# Patient Record
Sex: MALE | Race: WHITE | Employment: OTHER | URBAN - METROPOLITAN AREA
[De-identification: names, ages, dates, MRNs, and addresses within clinical notes are randomized per-mention and may not be internally consistent; named-entity substitution may affect disease eponyms.]

---

## 2017-12-22 ENCOUNTER — LAB REQUISITION (OUTPATIENT)
Dept: LAB | Facility: HOSPITAL | Age: 67
End: 2017-12-22
Payer: MEDICARE

## 2017-12-22 DIAGNOSIS — M71.121 OTHER INFECTIVE BURSITIS, RIGHT ELBOW: ICD-10-CM

## 2017-12-22 PROCEDURE — 89051 BODY FLUID CELL COUNT: CPT | Performed by: ORTHOPAEDIC SURGERY

## 2017-12-22 PROCEDURE — 89060 EXAM SYNOVIAL FLUID CRYSTALS: CPT | Performed by: ORTHOPAEDIC SURGERY

## 2017-12-22 PROCEDURE — 87205 SMEAR GRAM STAIN: CPT | Performed by: ORTHOPAEDIC SURGERY

## 2017-12-22 PROCEDURE — 87070 CULTURE OTHR SPECIMN AEROBIC: CPT | Performed by: ORTHOPAEDIC SURGERY

## 2017-12-22 PROCEDURE — 89050 BODY FLUID CELL COUNT: CPT | Performed by: ORTHOPAEDIC SURGERY

## 2018-01-05 ENCOUNTER — APPOINTMENT (OUTPATIENT)
Dept: LAB | Facility: HOSPITAL | Age: 68
End: 2018-01-05
Attending: ORTHOPAEDIC SURGERY
Payer: MEDICARE

## 2018-01-05 DIAGNOSIS — M70.21 OLECRANON BURSITIS OF RIGHT ELBOW: ICD-10-CM

## 2018-01-05 DIAGNOSIS — Z01.818 PREOP TESTING: Primary | ICD-10-CM

## 2018-01-05 LAB
ANION GAP SERPL CALC-SCNC: 10 MMOL/L (ref 0–18)
BUN SERPL-MCNC: 16 MG/DL (ref 8–20)
BUN/CREAT SERPL: 13.8 (ref 10–20)
CALCIUM SERPL-MCNC: 9 MG/DL (ref 8.5–10.5)
CHLORIDE SERPL-SCNC: 102 MMOL/L (ref 95–110)
CO2 SERPL-SCNC: 28 MMOL/L (ref 22–32)
CREAT SERPL-MCNC: 1.16 MG/DL (ref 0.5–1.5)
GLUCOSE SERPL-MCNC: 96 MG/DL (ref 70–99)
OSMOLALITY UR CALC.SUM OF ELEC: 291 MOSM/KG (ref 275–295)
POTASSIUM SERPL-SCNC: 3 MMOL/L (ref 3.3–5.1)
SODIUM SERPL-SCNC: 140 MMOL/L (ref 136–144)

## 2018-01-05 PROCEDURE — 80048 BASIC METABOLIC PNL TOTAL CA: CPT

## 2018-01-05 PROCEDURE — 93010 ELECTROCARDIOGRAM REPORT: CPT | Performed by: ORTHOPAEDIC SURGERY

## 2018-01-05 PROCEDURE — 36415 COLL VENOUS BLD VENIPUNCTURE: CPT

## 2018-01-05 PROCEDURE — 93005 ELECTROCARDIOGRAM TRACING: CPT

## 2018-01-05 RX ORDER — LISINOPRIL 30 MG/1
30 TABLET ORAL 2 TIMES DAILY
COMMUNITY

## 2018-01-05 RX ORDER — HYDROCHLOROTHIAZIDE 25 MG/1
25 TABLET ORAL DAILY
COMMUNITY

## 2018-01-05 RX ORDER — AMLODIPINE BESYLATE 10 MG/1
10 TABLET ORAL DAILY
COMMUNITY

## 2018-01-05 RX ORDER — METOPROLOL SUCCINATE 100 MG/1
100 TABLET, EXTENDED RELEASE ORAL DAILY
COMMUNITY

## 2018-01-05 RX ORDER — RANITIDINE 150 MG/1
150 TABLET ORAL 2 TIMES DAILY
COMMUNITY

## 2018-01-05 RX ORDER — POTASSIUM CHLORIDE 1.5 G/1.77G
20 POWDER, FOR SOLUTION ORAL 2 TIMES DAILY
COMMUNITY

## 2018-01-08 ENCOUNTER — ANESTHESIA (OUTPATIENT)
Dept: SURGERY | Facility: HOSPITAL | Age: 68
End: 2018-01-08
Payer: MEDICARE

## 2018-01-08 ENCOUNTER — SURGERY (OUTPATIENT)
Age: 68
End: 2018-01-08

## 2018-01-08 ENCOUNTER — ANESTHESIA EVENT (OUTPATIENT)
Dept: SURGERY | Facility: HOSPITAL | Age: 68
End: 2018-01-08
Payer: MEDICARE

## 2018-01-08 ENCOUNTER — HOSPITAL ENCOUNTER (OUTPATIENT)
Facility: HOSPITAL | Age: 68
Setting detail: HOSPITAL OUTPATIENT SURGERY
Discharge: HOME | End: 2018-01-08
Attending: ORTHOPAEDIC SURGERY | Admitting: ORTHOPAEDIC SURGERY
Payer: MEDICARE

## 2018-01-08 VITALS
SYSTOLIC BLOOD PRESSURE: 122 MMHG | HEART RATE: 59 BPM | BODY MASS INDEX: 29.1 KG/M2 | DIASTOLIC BLOOD PRESSURE: 62 MMHG | HEIGHT: 68 IN | OXYGEN SATURATION: 94 % | WEIGHT: 192 LBS | RESPIRATION RATE: 18 BRPM | TEMPERATURE: 98 F

## 2018-01-08 DIAGNOSIS — M71.121 SEPTIC OLECRANON BURSITIS OF RIGHT ELBOW: Primary | ICD-10-CM

## 2018-01-08 PROCEDURE — 88304 TISSUE EXAM BY PATHOLOGIST: CPT | Performed by: ORTHOPAEDIC SURGERY

## 2018-01-08 PROCEDURE — 87205 SMEAR GRAM STAIN: CPT | Performed by: ORTHOPAEDIC SURGERY

## 2018-01-08 PROCEDURE — 0MB30ZZ EXCISION OF RIGHT ELBOW BURSA AND LIGAMENT, OPEN APPROACH: ICD-10-PCS | Performed by: ORTHOPAEDIC SURGERY

## 2018-01-08 PROCEDURE — 87176 TISSUE HOMOGENIZATION CULTR: CPT | Performed by: ORTHOPAEDIC SURGERY

## 2018-01-08 PROCEDURE — 87070 CULTURE OTHR SPECIMN AEROBIC: CPT | Performed by: ORTHOPAEDIC SURGERY

## 2018-01-08 PROCEDURE — 88305 TISSUE EXAM BY PATHOLOGIST: CPT | Performed by: ORTHOPAEDIC SURGERY

## 2018-01-08 PROCEDURE — 87075 CULTR BACTERIA EXCEPT BLOOD: CPT | Performed by: ORTHOPAEDIC SURGERY

## 2018-01-08 RX ORDER — LIDOCAINE HYDROCHLORIDE 20 MG/ML
INJECTION, SOLUTION EPIDURAL; INFILTRATION; INTRACAUDAL; PERINEURAL AS NEEDED
Status: DISCONTINUED | OUTPATIENT
Start: 2018-01-08 | End: 2018-01-08

## 2018-01-08 RX ORDER — METOPROLOL TARTRATE 5 MG/5ML
2.5 INJECTION INTRAVENOUS ONCE
Status: DISCONTINUED | OUTPATIENT
Start: 2018-01-08 | End: 2018-01-08

## 2018-01-08 RX ORDER — MORPHINE SULFATE 10 MG/ML
6 INJECTION, SOLUTION INTRAMUSCULAR; INTRAVENOUS EVERY 10 MIN PRN
Status: DISCONTINUED | OUTPATIENT
Start: 2018-01-08 | End: 2018-01-08

## 2018-01-08 RX ORDER — SODIUM CHLORIDE, SODIUM LACTATE, POTASSIUM CHLORIDE, CALCIUM CHLORIDE 600; 310; 30; 20 MG/100ML; MG/100ML; MG/100ML; MG/100ML
INJECTION, SOLUTION INTRAVENOUS CONTINUOUS
Status: DISCONTINUED | OUTPATIENT
Start: 2018-01-08 | End: 2018-01-08

## 2018-01-08 RX ORDER — LIDOCAINE HYDROCHLORIDE 10 MG/ML
INJECTION, SOLUTION EPIDURAL; INFILTRATION; INTRACAUDAL; PERINEURAL AS NEEDED
Status: DISCONTINUED | OUTPATIENT
Start: 2018-01-08 | End: 2018-01-08 | Stop reason: SURG

## 2018-01-08 RX ORDER — NALOXONE HYDROCHLORIDE 0.4 MG/ML
80 INJECTION, SOLUTION INTRAMUSCULAR; INTRAVENOUS; SUBCUTANEOUS AS NEEDED
Status: DISCONTINUED | OUTPATIENT
Start: 2018-01-08 | End: 2018-01-08

## 2018-01-08 RX ORDER — METOCLOPRAMIDE 10 MG/1
10 TABLET ORAL ONCE
Status: DISCONTINUED | OUTPATIENT
Start: 2018-01-08 | End: 2018-01-08 | Stop reason: HOSPADM

## 2018-01-08 RX ORDER — CEPHALEXIN 500 MG/1
500 CAPSULE ORAL 4 TIMES DAILY
Qty: 28 CAPSULE | Refills: 0 | Status: SHIPPED | OUTPATIENT
Start: 2018-01-08

## 2018-01-08 RX ORDER — ACETAMINOPHEN 500 MG
1000 TABLET ORAL ONCE
Status: COMPLETED | OUTPATIENT
Start: 2018-01-08 | End: 2018-01-08

## 2018-01-08 RX ORDER — HYDROCODONE BITARTRATE AND ACETAMINOPHEN 5; 325 MG/1; MG/1
1 TABLET ORAL AS NEEDED
Status: DISCONTINUED | OUTPATIENT
Start: 2018-01-08 | End: 2018-01-08

## 2018-01-08 RX ORDER — HYDROCODONE BITARTRATE AND ACETAMINOPHEN 5; 325 MG/1; MG/1
1 TABLET ORAL EVERY 4 HOURS PRN
Qty: 30 TABLET | Refills: 0 | Status: SHIPPED | OUTPATIENT
Start: 2018-01-08

## 2018-01-08 RX ORDER — MIDAZOLAM HYDROCHLORIDE 1 MG/ML
INJECTION INTRAMUSCULAR; INTRAVENOUS AS NEEDED
Status: DISCONTINUED | OUTPATIENT
Start: 2018-01-08 | End: 2018-01-08 | Stop reason: SURG

## 2018-01-08 RX ORDER — HYDROCODONE BITARTRATE AND ACETAMINOPHEN 5; 325 MG/1; MG/1
2 TABLET ORAL AS NEEDED
Status: DISCONTINUED | OUTPATIENT
Start: 2018-01-08 | End: 2018-01-08

## 2018-01-08 RX ORDER — HALOPERIDOL 5 MG/ML
0.25 INJECTION INTRAMUSCULAR ONCE AS NEEDED
Status: DISCONTINUED | OUTPATIENT
Start: 2018-01-08 | End: 2018-01-08

## 2018-01-08 RX ORDER — FAMOTIDINE 20 MG/1
20 TABLET ORAL ONCE
Status: DISCONTINUED | OUTPATIENT
Start: 2018-01-08 | End: 2018-01-08 | Stop reason: HOSPADM

## 2018-01-08 RX ORDER — HYDROMORPHONE HYDROCHLORIDE 1 MG/ML
0.2 INJECTION, SOLUTION INTRAMUSCULAR; INTRAVENOUS; SUBCUTANEOUS EVERY 5 MIN PRN
Status: DISCONTINUED | OUTPATIENT
Start: 2018-01-08 | End: 2018-01-08

## 2018-01-08 RX ORDER — MORPHINE SULFATE 4 MG/ML
4 INJECTION, SOLUTION INTRAMUSCULAR; INTRAVENOUS EVERY 10 MIN PRN
Status: DISCONTINUED | OUTPATIENT
Start: 2018-01-08 | End: 2018-01-08

## 2018-01-08 RX ORDER — ONDANSETRON 2 MG/ML
4 INJECTION INTRAMUSCULAR; INTRAVENOUS ONCE AS NEEDED
Status: DISCONTINUED | OUTPATIENT
Start: 2018-01-08 | End: 2018-01-08

## 2018-01-08 RX ORDER — CEFAZOLIN SODIUM/WATER 2 G/20 ML
2 SYRINGE (ML) INTRAVENOUS ONCE
Status: COMPLETED | OUTPATIENT
Start: 2018-01-08 | End: 2018-01-08

## 2018-01-08 RX ORDER — MORPHINE SULFATE 2 MG/ML
2 INJECTION, SOLUTION INTRAMUSCULAR; INTRAVENOUS EVERY 10 MIN PRN
Status: DISCONTINUED | OUTPATIENT
Start: 2018-01-08 | End: 2018-01-08

## 2018-01-08 RX ORDER — HYDROMORPHONE HYDROCHLORIDE 1 MG/ML
0.6 INJECTION, SOLUTION INTRAMUSCULAR; INTRAVENOUS; SUBCUTANEOUS EVERY 5 MIN PRN
Status: DISCONTINUED | OUTPATIENT
Start: 2018-01-08 | End: 2018-01-08

## 2018-01-08 RX ORDER — HYDROMORPHONE HYDROCHLORIDE 1 MG/ML
0.4 INJECTION, SOLUTION INTRAMUSCULAR; INTRAVENOUS; SUBCUTANEOUS EVERY 5 MIN PRN
Status: DISCONTINUED | OUTPATIENT
Start: 2018-01-08 | End: 2018-01-08

## 2018-01-08 RX ADMIN — MIDAZOLAM HYDROCHLORIDE 2 MG: 1 INJECTION INTRAMUSCULAR; INTRAVENOUS at 15:31:00

## 2018-01-08 RX ADMIN — LIDOCAINE HYDROCHLORIDE 50 MG: 10 INJECTION, SOLUTION EPIDURAL; INFILTRATION; INTRACAUDAL; PERINEURAL at 15:35:00

## 2018-01-08 RX ADMIN — SODIUM CHLORIDE, SODIUM LACTATE, POTASSIUM CHLORIDE, CALCIUM CHLORIDE: 600; 310; 30; 20 INJECTION, SOLUTION INTRAVENOUS at 15:31:00

## 2018-01-08 RX ADMIN — CEFAZOLIN SODIUM/WATER 2 G: 2 G/20 ML SYRINGE (ML) INTRAVENOUS at 15:51:00

## 2018-01-08 NOTE — OPERATIVE REPORT
Operative Note    Patient Name: Rito Baumgarten    Preoperative Diagnosis: right elbow septic olecranon bursitis    Postoperative Diagnosis: right elbow septic olecranon bursitis    Primary Surgeon: Murray Jackson MD     Assistant: Vilma Ballard, [facility]    Pro

## 2018-01-08 NOTE — ANESTHESIA POSTPROCEDURE EVALUATION
Patient: Neva Will    Procedure Summary     Date:  01/08/18 Room / Location:  [address] [place] [address] / [address] [place] [address] MAIN OR    Anesthesia Start:  7407 Anesthesia Stop:  0624    Procedure:  EXTREMITY UPPER IRRIGATION & DEBRIDEMENT (Right Elbow) Diagnosis:  (right elbow

## 2018-01-08 NOTE — H&P
ORTHO SURGERY H&P  Vidal Jacobs is a 75year old male. MRN is [id number]. Admitted: (Not on file)    CC: Right elbow pain and redness    HPI: Mr. Tess Washington is a 75year old male who presents to the office complaining of right elbow pain and redness.  He in smoke two packs of cigarettes a day for 26 years. He quit smoking 30 years ago. He reports occasional alcohol use. He consumes alcohol socially. He denies any intravenous, illicit, and recreational drug use.      Family History: CVA (father), HTN, and cance complete resolution of his symptoms. Dr. Apodaca Child believes he has a persistent infection. Dr. Apodaca Child suggested right elbow surgical incision and drainage with irrigation and debridement of the area. Risks and benefits of the procedure were discussed.

## 2018-01-08 NOTE — ANESTHESIA PREPROCEDURE EVALUATION
Anesthesia PreOp Note    HPI:     Shazia Aguirre is a 75year old male who presents for preoperative consultation requested by: Cyndie Ramirez MD    Date of Surgery: 1/8/2018    Procedure(s):  EXTREMITY UPPER IRRIGATION & DEBRIDEMENT  Indication: right [place], [place]     No current [facility]-ordered outpatient prescriptions on file.     No Known Allergies    Family History   Problem Relation Age of Onset   • Other [OTHER] Father      stroke       Social History  Social History   Marital status:  Jose Luis Delarosa  of the nature of the anesthetic plan, benefits, risks, major complications, and any alternative forms of anesthetic management. All of the patient's questions were answered to the best of my ability.  The patient desires the anesthetic man

## 2018-01-09 NOTE — OPERATIVE REPORT
[facility]    PATIENT'S NAME: MAKEDA ALBRIGHT   ATTENDING PHYSICIAN: Andrew Barker MD   OPERATING PHYSICIAN: Andrew Barker MD   PATIENT ACCOUNT#:   [id number]    LOCATION:  [address]  MEDICAL RECORD #:   [id number]       DATE operating table. After adequate monitored anesthesia was obtained, a tourniquet was placed on the right arm. Antibiotics were not given until specimens were obtained. The right elbow and upper extremity were then prepped and draped in a sterile fashion.

## 2025-04-07 DIAGNOSIS — Z47.89 ORTHOPEDIC AFTERCARE: Primary | ICD-10-CM

## 2025-05-30 NOTE — H&P
[facility]  part of [facility]    History & Physical    Clay Ramirez Patient Status:  Hospital Outpatient Surgery    1950 MRN [id number]   Location [facility] OPERATING ROOM Attending Andrew Meade MD   Hosp Day # 0 PCP No primary care provider on file.     Date:  2025  Date of Admission:  (Not on file)    History provided by:patient  Chief Complaint:   No chief complaint on file.    Left knee pain   HPI:   Clay Ramirez is a(n) 74 year old male. Presents complaining of left knee pain. He has a right total knee arthroplasty in  with a different provider. He has done well with the right knee. He has noted increasing pain in the left knee for the last several months. There was no specific injury. He has had some difficulty straightening the knee when standing. He has some pain posteriorly at rest and when standing and bearing weight. He reports mechanical giving way. No mechanical locking. He does have occasional swelling. He does report some posterior left hip pain but no significant back pain.     PMHx: HTN, hyperlipidemia, hypercholesterolemia, GERD, gout  NKDA  Medications: ASA 81 mg, amlodipine, atorvastatin, pantoprazole, tamsulosin, lisinopril, metoprolol, spironolactone, allopurinol, Norco    History   Past Medical History[1]  Past Surgical History[2]  Family History[3]  Social History:  Short Social Hx on File[4]  Allergies/Medications:   Allergies: Allergies[5]  Prescriptions Prior to Admission[6]    Review of Systems:   Pertinent items are noted in HPI.    Physical Exam:   Vital Signs:  Height 5' 8\" (1.727 m), weight 190 lb (86.2 kg).     General appearance: alert, appears stated age and cooperative  Extremities: He walks with slightly antalgic gait on the left.   Further exam reveals 1+ effusion. He is tender at the mid medial and posterior medial joint line. There is also mild tenderness at the mid lateral joint line.  Lachman sign and posterior drawer are negative. The knee is stable to varus and valgus stress at 30 degrees and full extension. Range of motion of the knee is from 0 to 120 degrees. No pain with passive range of motion of the hip.  X-rays show symmetric narrowing of the medial and lateral compartments. Mild patellofemoral degenerative changes are noted. No fracture of soft tissue calcification.  MRI shows mild degenerative changes along the medial compartment. There is tearing of the posterior horn of medial meniscus. There is no significant bone marrow edema. No obvious ligament pathology.   Pulses: 2+ and symmetric  Neurologic: Alert and oriented X 3, normal strength and tone. Normal symmetric reflexes. Normal coordination and gait        Results:     Lab Results   Component Value Date    CREATSERUM 1.16 01/05/2018    BUN 16 01/05/2018     01/05/2018    K 3.0 (L) 01/05/2018     01/05/2018    CO2 28 01/05/2018    GLU 96 01/05/2018    CA 9.0 01/05/2018       No results found.        Assessment/Plan:     * No active hospital problems. *    Assessment: Left knee medial meniscus tear     Plan: I discussed operative and nonoperative treatment options. Patient would like to proceed with left knee arthroscopy with possible medial meniscectomy or meniscal repair. Risks and benefits discussed. Questions were answered. No guarantees were made. Follow up on the date of the procedure.    Follow up in office one week after surgery.     Yudith Guaman PA-C  5/30/2025         [1]   Past Medical History:   Cancer (HCC)    prostate    Cerebral hemorrhage (HCC)    Esophageal reflux    Gout    Heart attack (HCC)    High blood pressure    High cholesterol    Stroke (HCC)    tia - 1/2016 no residual weaknesses   [2]   Past Surgical History:  Procedure Laterality Date    Arthroscopy of joint unlisted Left     shoulder    Cath bare metal stent (bms)      Cholecystectomy      Other Left     left elbow I&D    Total knee  replacement      right knee   [3]   Family History  Problem Relation Age of Onset    Other (Other) Father         stroke   [4]   Social History  Socioeconomic History    Marital status:    Tobacco Use    Smoking status: Former     Types: Cigarettes    Smokeless tobacco: Never    Tobacco comments:     quit 30 yrs ago   Vaping Use    Vaping status: Never Used   Substance and Sexual Activity    Alcohol use: Yes     Comment: socially    Drug use: No     Social Drivers of Health     Food Insecurity: No Food Insecurity (5/15/2025)    Received from [facility]    Hunger Vital Sign     Worried About Running Out of Food in the Last Year: Never true     Ran Out of Food in the Last Year: Never true   Transportation Needs: No Transportation Needs (5/15/2025)    Received from [facility]    PRAPARE - Transportation     Lack of Transportation (Medical): No     Lack of Transportation (Non-Medical): No   Housing Stability: Low Risk  (9/10/2024)    Received from [facility]    Housing Stability Vital Sign     Unable to Pay for Housing in the Last Year: No     Number of Places Lived in the Last Year: 1     Unstable Housing in the Last Year: No   [5] No Known Allergies  [6]   No medications prior to admission.

## 2025-06-02 NOTE — OPERATIVE REPORT
Operative Note    Patient Name: Clay Ramirez    Preoperative Diagnosis: Left knee medial meniscus tear    Postoperative Diagnosis: Left knee medial meniscus tear, lateral meniscus tear, chondral lesion medial femoral condyle, synovitis    Primary Surgeon: GLORIA SHIPMAN MD     Assistant: Rowena    Procedures: L knee arthroscopy, medial meniscus repair, microfracture MFC lesion, partial medial and lateral meniscectomies, limited synovectomy    Surgical Findings: above    Anesthesia: General    Complications: none    Specimen: none    Drains: none    Condition: stable to RR    Estimated Blood Loss: 10cc    GLORIA SHIPMAN MD

## 2025-06-02 NOTE — ANESTHESIA PREPROCEDURE EVALUATION
Anesthesia PreOp Note    HPI:     Clay Ramirez is a 74 year old male who presents for preoperative consultation requested by: Andrew Meade MD    Date of Surgery: 6/2/2025    Procedure(s):  Left knee arthroscopy, partial medial meniscectomy, chondral debridement  Indication: Left knee medial meniscus tear    Relevant Problems   No relevant active problems       NPO:  Last Liquid Consumption Date: 06/01/25  Last Liquid Consumption Time: 1900  Last Solid Consumption Date: 06/01/25  Last Solid Consumption Time: 1730  Last Liquid Consumption Date: 06/01/25          History Review:  There are no active problems to display for this patient.      Past Medical History[1]    Past Surgical History[2]    Prescriptions Prior to Admission[3]  Current Medications and Prescriptions Ordered in Epic[4]    Allergies[5]    Family History[6]  Social Hx on file[7]    Available pre-op labs reviewed.             Vital Signs:  Body mass index is 28.59 kg/m².   height is 1.727 m (5' 8\") and weight is 85.3 kg (188 lb). His oral temperature is 98.2 °F (36.8 °C). His blood pressure is 116/65 and his pulse is 67. His respiration is 17 and oxygen saturation is 99%.   Vitals:    05/30/25 1244 06/02/25 1350   BP:  116/65   Pulse:  67   Resp:  17   Temp:  98.2 °F (36.8 °C)   TempSrc:  Oral   SpO2:  99%   Weight: 86.2 kg (190 lb) 85.3 kg (188 lb)   Height: 1.727 m (5' 8\") 1.727 m (5' 8\")        Anesthesia Evaluation     Patient summary reviewed and Nursing notes reviewed    Airway   Mallampati: II  TM distance: >3 FB  Neck ROM: full  Dental      Pulmonary - normal exam    breath sounds clear to auscultation  Cardiovascular - normal exam  (+) hypertension    ECG reviewed  Rhythm: regular  Rate: normal    Neuro/Psych    (+)  CVA,        GI/Hepatic/Renal    (+) GERD    Endo/Other - negative ROS   Abdominal                  Anesthesia Plan:   ASA:  3  Plan:   General  Airway:  LMA  Post-op Pain Management: IV analgesics  Informed  Consent Plan and Risks Discussed With:  Patient      I have informed Clay Ramirez and/or legal guardian or family member of the nature of the anesthetic plan, benefits, risks including possible dental damage if relevant, major complications, and any alternative forms of anesthetic management.   All of the patient's questions were answered to the best of my ability. The patient desires the anesthetic management as planned.  GISELL ADAMS MD  6/2/2025 1:04 PM  Present on Admission:  **None**           [1]   Past Medical History:   Cancer (HCC)    prostate    Cerebral hemorrhage (HCC)    Esophageal reflux    Gout    Heart attack (HCC)    High blood pressure    High cholesterol    Stroke (HCC)    tia - 1/2016 no residual weaknesses   [2]   Past Surgical History:  Procedure Laterality Date    Arthroscopy of joint unlisted Left     shoulder    Cath bare metal stent (bms)      Cholecystectomy      Other Left     left elbow I&D    Total knee replacement      right knee   [3]   Medications Prior to Admission   Medication Sig Dispense Refill Last Dose/Taking    amLODIPine 2.5 MG Oral Tab Take 1 tablet (2.5 mg total) by mouth daily.   6/2/2025    spironolactone 25 MG Oral Tab Take 1 tablet (25 mg total) by mouth daily.   6/2/2025 at  7:30 AM    allopurinol 100 MG Oral Tab Take 1 tablet (100 mg total) by mouth 2 (two) times daily.   6/2/2025 at  7:30 AM    pantoprazole 40 MG Oral Tab EC Take 1 tablet (40 mg total) by mouth before breakfast.   6/2/2025 at  7:30 AM    tamsulosin 0.4 MG Oral Cap Take 1 capsule (0.4 mg total) by mouth daily. 30 MIN AFTER SAME MEAK   6/2/2025 at  7:30 AM    lisinopril 30 MG Oral Tab Take 1 tablet (30 mg total) by mouth every other day.   6/1/2025 at  5:30 AM    Metoprolol Succinate  MG Oral Tablet 24 Hr Take 1 tablet (100 mg total) by mouth in the morning.   6/2/2025 at  7:30 AM    aspirin 81 MG Oral Tab Take 1 tablet (81 mg total) by mouth in the morning.   5/29/2025     atorvastatin 80 MG Oral Tab Take 1 tablet (80 mg total) by mouth daily.   Unknown    HYDROcodone-acetaminophen 5-325 MG Oral Tab Take 1 tablet by mouth every 4 (four) hours as needed for Pain. 30 tablet 0    [4]   Current Facility-Administered Medications Ordered in Epic   Medication Dose Route Frequency Provider Last Rate Last Admin    lactated ringers infusion   Intravenous Continuous Andrew Meade MD 20 mL/hr at 06/02/25 1401 New Bag at 06/02/25 1401    metoprolol tartrate (Lopressor) tab 25 mg  25 mg Oral Once PRN Andrew Meade MD        ceFAZolin (Ancef) 2g in 10mL IV syringe premix  2 g Intravenous Once Andrew Meade MD         No current [facility]-ordered outpatient medications on file.   [5] No Known Allergies  [6]   Family History  Problem Relation Age of Onset    Other (Other) Father         stroke   [7]   Social History  Socioeconomic History    Marital status:    Tobacco Use    Smoking status: Former     Types: Cigarettes    Smokeless tobacco: Never    Tobacco comments:     quit 30 yrs ago   Vaping Use    Vaping status: Never Used   Substance and Sexual Activity    Alcohol use: Yes     Comment: socially    Drug use: No

## 2025-06-02 NOTE — ANESTHESIA POSTPROCEDURE EVALUATION
Patient: Clay Ramirez    Procedure Summary       Date: 06/02/25 Room / Location: [facility] MAIN OR  / [facility] MAIN OR    Anesthesia Start: 1422 Anesthesia Stop: 1523    Procedure: Left knee arthroscopy, medial repair, partial medial and lateral meniscectomy, microfracture medialfemoral chondral debridement (Left: Knee) Diagnosis: (Left knee medial meniscus tear)    Surgeons: Adnrew Meade MD Anesthesiologist: Gisell Morejon MD    Anesthesia Type: general ASA Status: 3            Anesthesia Type: general    Vitals Value Taken Time   /65 06/02/25 15:23   Temp 97.8 °F (36.6 °C) 06/02/25 15:23   Pulse 71 06/02/25 15:22   Resp 17 06/02/25 15:22   SpO2 89 % 06/02/25 15:22   Vitals shown include unfiled device data.    EM AN Post Evaluation:   Patient Evaluated in PACU  Patient Participation: complete - patient participated  Level of Consciousness: awake and alert  Pain Management: adequate  Airway Patency:patent  Dental exam unchanged from preop  Yes    Nausea/Vomiting: none  Cardiovascular Status: acceptable  Respiratory Status: acceptable  Postoperative Hydration acceptable      GISELL MOREJON MD  6/2/2025 3:23 PM

## 2025-06-02 NOTE — OPERATIVE REPORT
[facility]    PATIENT'S NAME: MAKEDA WELCH   ATTENDING PHYSICIAN: Andrew Meade MD   OPERATING PHYSICIAN: Andrew Meade MD   PATIENT ACCOUNT#:   [id number]    LOCATION:  [facility]  MEDICAL RECORD #:   [id number]       YOB: 1950  ADMISSION DATE:       06/02/2025      OPERATION DATE:  06/02/2025    OPERATIVE REPORT      PREOPERATIVE DIAGNOSIS:  Left knee medial meniscus tear.  POSTOPERATIVE DIAGNOSIS:    1.   Left knee medial and lateral meniscus tears.  2.   Left knee chondral lesion, medial femoral condyle.  3.   Left knee synovitis.  PROCEDURE:    1.   Left knee arthroscopy.  2.   Medial meniscus repair.  3.   Microfracture, medial femoral condyle chondral lesion.  4.   Partial medial and lateral meniscectomies.  5.   Limited synovectomy.    ASSISTANT:  Yudith Guaman PA-C.    ANESTHESIA:  General.    COMPLICATIONS:  None.    BLOOD LOSS:  10 mL.    SPECIMEN:  None.    DRAIN:  None.    INDICATIONS:  Patient is a 74-year-old male with history of left knee pain localizing to the medial knee.  Preoperative physical findings and imaging study showed a tear of the medial meniscus with minimal degenerative changes.  After discussion with the patient the risks and benefits of proceeding with operative treatment of the left knee consisting of a partial medial meniscectomy or meniscal repair, he wished to proceed with surgery.    FINDINGS:  Examination under anesthesia, the patient had full passive range of motion when anesthetized.  Lachman sign and posterior drawer were negative.  The knee was stable to varus/valgus stress at 30 degrees and full extension.    ARTHROSCOPIC FINDINGS:    1.   Patellofemoral joint:  The undersurface of the patella and the femoral trochlea had grade 1 degenerative softening.  Position of the patella with central tracking was central.  2.   Notch:  The ACL and PCL were intact, unremarkable.  3.   Medial compartment:  The medial  femoral condyle had a chondral lesion on the weightbearing surface grade 3 measuring 10 x 10 mm in size.  The medial tibial plateau had grade 1 degenerative softening.  The medial meniscus had a complex type tear of the posterior horn with a linear component and some fibrillation and horizontal component on the undersurface.  There was a small displaceable fragment near the root probing on the posterior horn demonstrated some instability.  Anterior horn with some hypertrophic synovial tissue but no tear.  4.   Lateral compartment:  Lateral femoral condyle and tibial plateau had grade 1 degenerative softening.  The lateral meniscus had a tear of the anterior horn extending to the body with fibrillation along the inner margin of the meniscus and some radial tearing of the body.  5.   Gutters and suprapatellar pouch unremarkable.  There were no significant loose bodies or plica.    OPERATIVE TECHNIQUE:  The patient was identified in the preoperative holding area.  The appropriate consents were obtained.  He was taken to the operating room, placed in supine position on the operating table.  After adequate general anesthesia was obtained, tourniquet was placed on the left thigh.  The left lower extremity was placed in the arthroscopic leg stoner.  The right lower extremity was placed in the padded well leg stoner with the hip flexed to 90 degrees and abducted 45 degrees.  An SCD device was placed on the right lower extremity.  The left knee was preinjected with 10 mL of 0.5% Marcaine with epinephrine.  The knee was then prepped and draped in a sterile fashion.  The extremity was exsanguinated.  The tourniquet was inflated to 250 mmHg.  Anterior portals were established.  A superolateral outflow cannula was inserted.  The camera was inserted through the lateral portal and a thorough examination of the knee joint was performed.  The findings were as stated.  Next, using a combination of cutting instruments, motorized  shaver and radiofrequency wand, a partial medial and lateral meniscectomy was performed.  The posterior horn of the medial meniscus was resected back to stable meniscal rim.  Probing of the remaining posterior horn showed some instability and therefore, decision was made to proceed with repair.  The lateral meniscus was treated with a motorized shaver.  The inner margin of the meniscus was debrided back to stable meniscal rim.  Next, 2 Arthrex all-suture FiberStitch meniscal repair devices were deployed in the posterior horn of the medial meniscus, 1 on the undersurface, 1 on the superior surface in a horizontal mattress configuration.  Excellent stability of the posterior horn was now noted.  Excess suture was removed.  We performed a microfracture in the medial femoral condyle lesion by creating a well-shouldered lesion with a curette.  Two small holes were made through the subchondral bone plate with microfracture awl.  These were spaced 2 mm apart.  Good bleeding elements were noted.  Next, a motorized shaver was used to perform a limited synovectomy.  Synovial tissue was debrided from the intercondylar notch.  The anterior compartment of the knee and inferior patellofemoral joint was then suction irrigated.  Attention was paid to hemostasis.  The instruments were removed.  The portals were closed with 4-0 nylon sutures.  Sterile dressing was applied as well as an Ace wrap.  The patient's anesthesia was reversed.  He was extubated and taken to the recovery room in stable condition.  All sponge and instrument counts were reported as correct.  The attending physician, Dr. Meade, was present and performed all critical portions of the procedure.  There were no complications.  The first assistant was medically necessary for the surgery.  She assisted with patient positioning.  She operated the arthroscope during portions of the procedure.  She assisted with suture management, hemostasis, and wound closure.   Without the aid of the assistant, the surgical procedure would not have been possible.    Dictated By Andrew Meade MD  d: 06/02/2025 15:17:58  t: 06/02/2025 17:01:42  [facility] [id number]  DLO/

## 2025-06-02 NOTE — ANESTHESIA PROCEDURE NOTES
Airway  Date/Time: 6/2/2025 2:29 PM  Reason: Elective      General Information and Staff   Patient location during procedure: OR  Anesthesiologist: Melvin Morejon MD  Performed: anesthesiologist   Performed by: Melvin Morejon MD  Authorized by: Melvin Morejon MD        Indications and Patient Condition  Indications for airway management: anesthesia  Sedation level: deep      Preoxygenated: yesPatient position: sniffing    Mask difficulty assessment: 1 - vent by mask    Final Airway Details    Final airway type: supraglottic airway      Successful airway: classic  Size: 4     Number of attempts at approach: 1

## 2025-06-02 NOTE — DISCHARGE INSTRUCTIONS
HOME INSTRUCTIONS    Norco or tylenol for pain.    Ibuprofen for pain/inflammation.    May remove dressing and place bandaids over incisions in 2 days.    Keep incisions clean and dry.   Ice and elevate knee.    Toe touch weightbearing with walker.      Ok to begin range of motion as tolerated.    Follow up with Dr. Meade in 1 week [phone].        [facility] C[facility] INSTRUCTIONS: POST-OP ANESTHESIA  The medication that you received for sedation or general anesthesia can last up to 24 hours. Your judgment and reflexes may be altered, even if you feel like your normal self.      We Recommend:   Do not drive any motor vehicle or bicycle   Avoid mowing the lawn, playing sports, or working with power tools/applicances (power saws, electric knives or mixers)   That you have someone stay with you on your first night home   Do not drink alcohol or take sleeping pills or tranquilizers   Do not sign legal documents within 24 hours of your procedure   If you had a nerve block for your surgery, take extra care not to put any pressure on your arm or hand for 24 hours    It is normal:  For you to have a sore throat if you had a breathing tube during surgery (while you were asleep!). The sore throat should get better within 48 hours. You can gargle with warm salt water (1/2 tsp in 4 oz warm water) or use a throat lozenge for comfort  To feel muscle aches or soreness especially in the abdomen, chest or neck. The achy feeling should go away in the next 24 hours  To feel weak, sleepy or \"wiped out\". Your should start feeling better in the next 24 hours.   To experience mild discomforts such as sore lip or tongue, headache, cramps, gas pains or a bloated feeling in your abdomen.   To experience mild back pain or soreness for a day or two if you had spinal or epidural anesthesia.   If you had laparoscopic surgery, to feel shoulder pain or discomfort on the day of surgery.   For some patients to have nausea after  surgery/anesthesia    If you feel nausea or experience vomiting:   Try to move around less.   Eat less than usual or drink only liquids until the next morning   Nausea should resolve in about 24 hours    If you have a problem when you are at home:    Call your surgeons office     Discharge Instructions: After Your Surgery  You’ve just had surgery. During surgery, you were given medicine called anesthesia to keep you relaxed and free of pain. After surgery, you may have some pain or nausea. This is common. Here are some tips for feeling better and getting well after surgery.   Going home  Your healthcare provider will show you how to take care of yourself when you go home. They'll also answer your questions. Have an adult family member or friend drive you home. For the first 24 hours after your surgery:   Don't drive or use heavy equipment.  Don't make important decisions or sign legal papers.  Take medicines as directed.  Don't drink alcohol.  Have someone stay with you, if needed. They can watch for problems and help keep you safe.  Be sure to go to all follow-up visits with your healthcare provider. And rest after your surgery for as long as your provider tells you to.   Coping with pain  If you have pain after surgery, pain medicine will help you feel better. Take it as directed, before pain becomes severe. Also, ask your healthcare provider or pharmacist about other ways to control pain. This might be with heat, ice, or relaxation. And follow any other instructions your surgeon or nurse gives you.      Stay on schedule with your medicine.     Tips for taking pain medicine  To get the best relief possible, remember these points:   Pain medicines can upset your stomach. Taking them with a little food may help.  Most pain relievers taken by mouth need at least 20 to 30 minutes to start to work.  Don't wait till your pain becomes severe before you take your medicine. Try to time your medicine so that you can take it  before starting an activity. This might be before you get dressed, go for a walk, or sit down for dinner.  Constipation is a common side effect of some pain medicines. Call your healthcare provider before taking any medicines, such as laxatives or stool softeners, to help ease constipation. Also ask if you should skip any foods. Drinking lots of fluids and eating foods, such as fruits and vegetables, that are high in fiber can also help. Remember, don't take laxatives unless your surgeon has prescribed them.  Drinking alcohol and taking pain medicine can cause dizziness and slow your breathing. It can even be deadly. Don't drink alcohol while taking pain medicine.  Pain medicine can make you react more slowly to things. Don't drive or run machinery while taking pain medicine.  Your healthcare provider may tell you to take acetaminophen to help ease your pain. Ask them how much you're supposed to take each day. Acetaminophen or other pain relievers may interact with your prescription medicines or other over-the-counter (OTC) medicines. Some prescription medicines have acetaminophen and other ingredients in them. Using both prescription and OTC acetaminophen for pain can cause you to accidentally overdose. Read the labels on your OTC medicines with care. This will help you to clearly know the list of ingredients, how much to take, and any warnings. It may also help you not take too much acetaminophen. If you have questions or don't understand the information, ask your pharmacist or healthcare provider to explain it to you before you take the OTC medicine.   Managing nausea  Some people have an upset stomach (nausea) after surgery. This is often because of anesthesia, pain, or pain medicine, less movement of food in the stomach, or the stress of surgery. These tips will help you handle nausea and eat healthy foods as you get better. If you were on a special food plan before surgery, ask your healthcare provider if you  should follow it while you get better. Check with your provider on how your eating should progress. It may depend on the surgery you had. These general tips may help:   Don't push yourself to eat. Your body will tell you when to eat and how much.  Start off with clear liquids and soup. They're easier to digest.  Next try semi-solid foods as you feel ready. These include mashed potatoes, applesauce, and gelatin.  Slowly move to solid foods. Don’t eat fatty, rich, or spicy foods at first.  Don't force yourself to have 3 large meals a day. Instead eat smaller amounts more often.  Take pain medicines with a small amount of solid food, such as crackers or toast. This helps prevent nausea.  When to call your healthcare provider  Call your healthcare provider right away if you have any of these:   You still have too much pain, or the pain gets worse, after taking the medicine. The medicine may not be strong enough. Or there may be a complication from the surgery.  You feel too sleepy, dizzy, or groggy. The medicine may be too strong.  Side effects, such as nausea or vomiting. Your healthcare provider may advise taking other medicines to treat these or may change your treatment plan..  Skin changes, such as rash, itching, or hives. This may mean you have an allergic reaction. Your provider may advise taking other medicines.  The incision looks different (for instance, part of it opens up).  Bleeding or fluid leaking from the incision site, and you weren't told to expect that.  Fever of 100.4°F (38°C) or higher, or as directed by your healthcare provider.  Call 911  Call 911 right away if you have:   Trouble breathing  Facial swelling    If you have obstructive sleep apnea   You were given anesthesia medicine during surgery to keep you comfortable and free of pain. After surgery, you may have more apnea spells because of this medicine and other medicines you were given. The spells may last longer than normal.    At  home:  Keep using the continuous positive airway pressure (CPAP) device when you sleep. Unless your healthcare provider tells you not to, use it when you sleep, day or night. CPAP is a common device used to treat obstructive sleep apnea.  Talk with your provider before taking any pain medicine, muscle relaxants, or sedatives. Your provider will tell you about the possible dangers of taking these medicines.  Contact your provider if your sleeping changes a lot even when taking medicines as directed.  [employer] last reviewed this educational content on 4/1/2024  This information is for informational purposes only. This is not intended to be a substitute for professional medical advice, diagnosis, or treatment. Always seek the advice and follow the directions from your physician or other qualified health care provider.  © 3475-6689 [employer]. All rights reserved. This information is not intended as a substitute for professional medical care. Always follow your healthcare professional's instructions.

## (undated) DEVICE — DRAIN PENROSE 12X1/4

## (undated) DEVICE — BANDAGE ROLL,100% COTTON, 6 PLY, LARGE: Brand: KERLIX

## (undated) DEVICE — DRAIN RELIAVAC 100CC

## (undated) DEVICE — REM POLYHESIVE ADULT PATIENT RETURN ELECTRODE: Brand: VALLEYLAB

## (undated) DEVICE — STERILE LATEX POWDER-FREE SURGICAL GLOVESWITH NITRILE COATING: Brand: PROTEXIS

## (undated) DEVICE — COVER SGL STRL LGHT HNDL BLU

## (undated) DEVICE — CULTURE COLLECT/TRANSPORT SYS

## (undated) DEVICE — SUCTION CANISTER, 3000CC,SAFELINER: Brand: DEROYAL

## (undated) DEVICE — 3M™ IOBAN™ 2 ANTIMICROBIAL INCISE DRAPE 6650EZ: Brand: IOBAN™ 2

## (undated) DEVICE — 3M™ COBAN™ NL STERILE NON-LATEX SELF-ADHERENT WRAP, 2084S, 4 IN X 5 YD (10 CM X 4,5 M), 18 ROLLS/CASE: Brand: 3M™ COBAN™

## (undated) DEVICE — ABDOMINAL PAD: Brand: CURITY

## (undated) DEVICE — CULTURE TUBE ANAEROBIC

## (undated) DEVICE — SOL  .9 1000ML BTL

## (undated) DEVICE — SUTURE ETHILON 3-0 669H

## (undated) DEVICE — SPONGE: LAP 18X18 PW 200/CS: Brand: NOVAPLUS®

## (undated) DEVICE — ZIMMER® STERILE DISPOSABLE TOURNIQUET CUFF WITH PLC, DUAL PORT, DUAL BLADDER, 18 IN. (46 CM)

## (undated) DEVICE — SPLINT PRECUT SYNTH 4X30

## (undated) DEVICE — CHLORAPREP 26ML APPLICATOR

## (undated) DEVICE — STERILE POLYISOPRENE POWDER-FREE SURGICAL GLOVES: Brand: PROTEXIS

## (undated) DEVICE — MEDI-VAC NON-CONDUCTIVE SUCTION TUBING: Brand: CARDINAL HEALTH

## (undated) DEVICE — UPPER EXTREMITY: Brand: MEDLINE INDUSTRIES, INC.

## (undated) DEVICE — SPECIALTY ARM SLING: Brand: DEROYAL